# Patient Record
Sex: FEMALE | Race: OTHER | HISPANIC OR LATINO | URBAN - METROPOLITAN AREA
[De-identification: names, ages, dates, MRNs, and addresses within clinical notes are randomized per-mention and may not be internally consistent; named-entity substitution may affect disease eponyms.]

---

## 2017-07-17 ENCOUNTER — EMERGENCY (EMERGENCY)
Facility: HOSPITAL | Age: 52
LOS: 1 days | Discharge: HOME | End: 2017-07-17
Attending: EMERGENCY MEDICINE
Payer: MEDICAID

## 2017-07-17 VITALS
DIASTOLIC BLOOD PRESSURE: 90 MMHG | HEART RATE: 76 BPM | WEIGHT: 283.96 LBS | SYSTOLIC BLOOD PRESSURE: 154 MMHG | TEMPERATURE: 98 F | OXYGEN SATURATION: 98 % | RESPIRATION RATE: 20 BRPM

## 2017-07-17 VITALS
SYSTOLIC BLOOD PRESSURE: 151 MMHG | RESPIRATION RATE: 20 BRPM | HEART RATE: 69 BPM | TEMPERATURE: 97 F | OXYGEN SATURATION: 96 % | DIASTOLIC BLOOD PRESSURE: 98 MMHG

## 2017-07-17 PROCEDURE — T1013: CPT

## 2017-07-17 PROCEDURE — 99283 EMERGENCY DEPT VISIT LOW MDM: CPT

## 2017-07-17 NOTE — ED STATDOCS - ENMT, MLM
Neck is supple with FROM. Pink moist mucous membranes. uvula and tongue are midline. L TM: erythematous and dull. R TM: erythematous and dull.

## 2017-07-17 NOTE — ED STATDOCS - CARE PLAN
Principal Discharge DX:	Bilateral otitis media, unspecified chronicity, unspecified otitis media type

## 2017-07-17 NOTE — ED STATDOCS - OBJECTIVE STATEMENT
53 y/o F w/ PMHX of HLD and HTN presents to ED c/o b/l ear pain x2 weeks. Associated cough and fever that has resolved. Pt reports both ears began at the same time. Pt states she went to her PMD who Rx abx. Pt was not referred to ENT. Denies nausea, vomiting, decreased PO intake, or urinary sx. Pt states she has a f/u appointment with her PMD in 2 days. No further complaints at this time.  She is allergic to Ibuprofen. PSHx= thyroid surgery

## 2017-07-27 ENCOUNTER — APPOINTMENT (OUTPATIENT)
Dept: OTOLARYNGOLOGY | Facility: CLINIC | Age: 52
End: 2017-07-27
Payer: MEDICAID

## 2017-07-27 VITALS
WEIGHT: 284 LBS | SYSTOLIC BLOOD PRESSURE: 114 MMHG | HEART RATE: 74 BPM | DIASTOLIC BLOOD PRESSURE: 73 MMHG | HEIGHT: 65 IN | BODY MASS INDEX: 47.32 KG/M2

## 2017-07-27 DIAGNOSIS — H93.8X3 OTHER SPECIFIED DISORDERS OF EAR, BILATERAL: ICD-10-CM

## 2017-07-27 DIAGNOSIS — H93.13 TINNITUS, BILATERAL: ICD-10-CM

## 2017-07-27 DIAGNOSIS — H90.3 SENSORINEURAL HEARING LOSS, BILATERAL: ICD-10-CM

## 2017-07-27 DIAGNOSIS — H61.21 IMPACTED CERUMEN, RIGHT EAR: ICD-10-CM

## 2017-07-27 PROCEDURE — 92567 TYMPANOMETRY: CPT

## 2017-07-27 PROCEDURE — 92557 COMPREHENSIVE HEARING TEST: CPT

## 2017-07-27 PROCEDURE — G0268 REMOVAL OF IMPACTED WAX MD: CPT

## 2017-07-27 PROCEDURE — 99204 OFFICE O/P NEW MOD 45 MIN: CPT | Mod: 25

## 2018-05-23 ENCOUNTER — APPOINTMENT (OUTPATIENT)
Dept: BARIATRICS | Facility: CLINIC | Age: 53
End: 2018-05-23

## 2020-09-15 ENCOUNTER — ASOB RESULT (OUTPATIENT)
Age: 55
End: 2020-09-15

## 2020-09-15 PROCEDURE — 76856 US EXAM PELVIC COMPLETE: CPT | Mod: 59

## 2020-09-15 PROCEDURE — 76830 TRANSVAGINAL US NON-OB: CPT | Mod: 59

## 2021-08-16 ENCOUNTER — OUTPATIENT (OUTPATIENT)
Dept: OUTPATIENT SERVICES | Facility: HOSPITAL | Age: 56
LOS: 1 days | End: 2021-08-16
Payer: MEDICAID

## 2021-08-16 VITALS
DIASTOLIC BLOOD PRESSURE: 80 MMHG | HEART RATE: 78 BPM | HEIGHT: 64 IN | TEMPERATURE: 98 F | WEIGHT: 293 LBS | RESPIRATION RATE: 18 BRPM | SYSTOLIC BLOOD PRESSURE: 142 MMHG | OXYGEN SATURATION: 98 %

## 2021-08-16 DIAGNOSIS — Z01.818 ENCOUNTER FOR OTHER PREPROCEDURAL EXAMINATION: ICD-10-CM

## 2021-08-16 DIAGNOSIS — K64.9 UNSPECIFIED HEMORRHOIDS: ICD-10-CM

## 2021-08-16 DIAGNOSIS — Z71.89 OTHER SPECIFIED COUNSELING: ICD-10-CM

## 2021-08-16 DIAGNOSIS — Z29.9 ENCOUNTER FOR PROPHYLACTIC MEASURES, UNSPECIFIED: ICD-10-CM

## 2021-08-16 DIAGNOSIS — E89.0 POSTPROCEDURAL HYPOTHYROIDISM: Chronic | ICD-10-CM

## 2021-08-16 LAB
A1C WITH ESTIMATED AVERAGE GLUCOSE RESULT: 5.8 % — HIGH (ref 4–5.6)
ALBUMIN SERPL ELPH-MCNC: 4.4 G/DL — SIGNIFICANT CHANGE UP (ref 3.3–5.2)
ALP SERPL-CCNC: 110 U/L — SIGNIFICANT CHANGE UP (ref 40–120)
ALT FLD-CCNC: 29 U/L — SIGNIFICANT CHANGE UP
ANION GAP SERPL CALC-SCNC: 12 MMOL/L — SIGNIFICANT CHANGE UP (ref 5–17)
AST SERPL-CCNC: 30 U/L — SIGNIFICANT CHANGE UP
BASOPHILS # BLD AUTO: 0.04 K/UL — SIGNIFICANT CHANGE UP (ref 0–0.2)
BASOPHILS NFR BLD AUTO: 0.5 % — SIGNIFICANT CHANGE UP (ref 0–2)
BILIRUB SERPL-MCNC: 0.8 MG/DL — SIGNIFICANT CHANGE UP (ref 0.4–2)
BUN SERPL-MCNC: 13.2 MG/DL — SIGNIFICANT CHANGE UP (ref 8–20)
CALCIUM SERPL-MCNC: 9.9 MG/DL — SIGNIFICANT CHANGE UP (ref 8.6–10.2)
CHLORIDE SERPL-SCNC: 104 MMOL/L — SIGNIFICANT CHANGE UP (ref 98–107)
CO2 SERPL-SCNC: 25 MMOL/L — SIGNIFICANT CHANGE UP (ref 22–29)
CREAT SERPL-MCNC: 0.68 MG/DL — SIGNIFICANT CHANGE UP (ref 0.5–1.3)
EOSINOPHIL # BLD AUTO: 0.14 K/UL — SIGNIFICANT CHANGE UP (ref 0–0.5)
EOSINOPHIL NFR BLD AUTO: 1.9 % — SIGNIFICANT CHANGE UP (ref 0–6)
ESTIMATED AVERAGE GLUCOSE: 120 MG/DL — HIGH (ref 68–114)
GLUCOSE SERPL-MCNC: 103 MG/DL — HIGH (ref 70–99)
HCT VFR BLD CALC: 42 % — SIGNIFICANT CHANGE UP (ref 34.5–45)
HGB BLD-MCNC: 13.4 G/DL — SIGNIFICANT CHANGE UP (ref 11.5–15.5)
IMM GRANULOCYTES NFR BLD AUTO: 0.1 % — SIGNIFICANT CHANGE UP (ref 0–1.5)
LYMPHOCYTES # BLD AUTO: 2.28 K/UL — SIGNIFICANT CHANGE UP (ref 1–3.3)
LYMPHOCYTES # BLD AUTO: 31.2 % — SIGNIFICANT CHANGE UP (ref 13–44)
MCHC RBC-ENTMCNC: 29.8 PG — SIGNIFICANT CHANGE UP (ref 27–34)
MCHC RBC-ENTMCNC: 31.9 GM/DL — LOW (ref 32–36)
MCV RBC AUTO: 93.5 FL — SIGNIFICANT CHANGE UP (ref 80–100)
MONOCYTES # BLD AUTO: 0.55 K/UL — SIGNIFICANT CHANGE UP (ref 0–0.9)
MONOCYTES NFR BLD AUTO: 7.5 % — SIGNIFICANT CHANGE UP (ref 2–14)
NEUTROPHILS # BLD AUTO: 4.28 K/UL — SIGNIFICANT CHANGE UP (ref 1.8–7.4)
NEUTROPHILS NFR BLD AUTO: 58.8 % — SIGNIFICANT CHANGE UP (ref 43–77)
PLATELET # BLD AUTO: 253 K/UL — SIGNIFICANT CHANGE UP (ref 150–400)
POTASSIUM SERPL-MCNC: 3.7 MMOL/L — SIGNIFICANT CHANGE UP (ref 3.5–5.3)
POTASSIUM SERPL-SCNC: 3.7 MMOL/L — SIGNIFICANT CHANGE UP (ref 3.5–5.3)
PROT SERPL-MCNC: 7.5 G/DL — SIGNIFICANT CHANGE UP (ref 6.6–8.7)
RBC # BLD: 4.49 M/UL — SIGNIFICANT CHANGE UP (ref 3.8–5.2)
RBC # FLD: 13.4 % — SIGNIFICANT CHANGE UP (ref 10.3–14.5)
SODIUM SERPL-SCNC: 141 MMOL/L — SIGNIFICANT CHANGE UP (ref 135–145)
T3 SERPL-MCNC: 89 NG/DL — SIGNIFICANT CHANGE UP (ref 80–200)
T4 AB SER-ACNC: 7.3 UG/DL — SIGNIFICANT CHANGE UP (ref 4.5–12)
TSH SERPL-MCNC: 1.2 UIU/ML — SIGNIFICANT CHANGE UP (ref 0.27–4.2)
WBC # BLD: 7.3 K/UL — SIGNIFICANT CHANGE UP (ref 3.8–10.5)
WBC # FLD AUTO: 7.3 K/UL — SIGNIFICANT CHANGE UP (ref 3.8–10.5)

## 2021-08-16 PROCEDURE — 83036 HEMOGLOBIN GLYCOSYLATED A1C: CPT

## 2021-08-16 PROCEDURE — 80053 COMPREHEN METABOLIC PANEL: CPT

## 2021-08-16 PROCEDURE — 93010 ELECTROCARDIOGRAM REPORT: CPT

## 2021-08-16 PROCEDURE — 93005 ELECTROCARDIOGRAM TRACING: CPT

## 2021-08-16 PROCEDURE — 85025 COMPLETE CBC W/AUTO DIFF WBC: CPT

## 2021-08-16 PROCEDURE — 84480 ASSAY TRIIODOTHYRONINE (T3): CPT

## 2021-08-16 PROCEDURE — 84436 ASSAY OF TOTAL THYROXINE: CPT

## 2021-08-16 PROCEDURE — 84443 ASSAY THYROID STIM HORMONE: CPT

## 2021-08-16 PROCEDURE — 36415 COLL VENOUS BLD VENIPUNCTURE: CPT

## 2021-08-16 PROCEDURE — G0463: CPT

## 2021-08-16 RX ORDER — ATORVASTATIN CALCIUM 80 MG/1
1 TABLET, FILM COATED ORAL
Qty: 0 | Refills: 0 | DISCHARGE

## 2021-08-16 RX ORDER — LEVOTHYROXINE SODIUM 125 MCG
0 TABLET ORAL
Qty: 0 | Refills: 0 | DISCHARGE

## 2021-08-16 RX ORDER — LISINOPRIL 2.5 MG/1
1 TABLET ORAL
Qty: 0 | Refills: 0 | DISCHARGE

## 2021-08-16 NOTE — H&P PST ADULT - NSICDXFAMILYHX_GEN_ALL_CORE_FT
FAMILY HISTORY:  FH: HTN (hypertension)    Mother  Still living? No  Family history of diabetes mellitus (DM), Age at diagnosis: Age Unknown

## 2021-08-16 NOTE — H&P PST ADULT - HISTORY OF PRESENT ILLNESS
CHARO MUKHERJEE is a 56/y/o Faroese speaking female aox3 .PMH of HTN , HLD thyroidectomy Sigmoid diverticulosis, internal hemorrhoids, Patient c/o rectal bleed she went to DR Meehan for evaluation she was found to have hemorrhoids . Patient last colonoscopy was 10 years ago as per patient she had normal exam .Patient denies change in bowel habits denies c/o pain or discomfort . Patient presents to [facility] evaluation for a screening colonoscopy with Dr Meehan on 8/25/21  Sigmoid diverticulosis, internal hemorrhoids   CHARO MUKHERJEE is a 56/y/o Belizean speaking female aox3 .PMH of HTN , HLD thyroidectomy Sigmoid diverticulosis, internal hemorrhoids, Patient c/o rectal bleeding she went to her PCP DR Yañez for evaluation.  Patient  was referred to Dr Meehan . Patient went to DR Meehan for evaluation, she was found to have hemorrhoids as per patient she given a cream which relieved her symptoms .Patient last colonoscopy was 10 years ago as per patient she had normal exam .Patient denies change in bowel habits denies c/o pain or discomfort . Patient presents to [facility] evaluation for a screening colonoscopy with Dr Meehan on 8/25/21     CHARO MUKHERJEE is a 56/y/o Ethiopian speaking female aox3 .PMH of HTN , HLD thyroidectomy Sigmoid diverticulosis, internal hemorrhoids, Patient c/o rectal bleeding she went to her PCP DR Yañez for evaluation.  Patient  was referred to Dr Meehan . Patient went to DR Meehan for evaluation, she was found to have hemorrhoids as per patient she given a cream which relieved her symptoms .Patient last colonoscopy was 10 years ago as per patient she had normal exam .Patient denies change in bowel habits denies c/o pain or discomfort . Patient presents to PST evaluation for a screening colonoscopy with Dr Meehan on 8/25/21

## 2021-08-16 NOTE — H&P PST ADULT - NSANTHOSAYNRD_GEN_A_CORE
No. FAUZIA screening performed.  STOP BANG Legend: 0-2 = LOW Risk; 3-4 = INTERMEDIATE Risk; 5-8 = HIGH Risk

## 2021-08-16 NOTE — H&P PST ADULT - EKG AND INTERPRETATION
EKG reviewed personally Rate =       Bpm   finding    official reading EKG reviewed personally Rate =  70     Bpm   finding   normal sinus rhythm pending  official reading

## 2021-08-16 NOTE — H&P PST ADULT - NSICDXPASTMEDICALHX_GEN_ALL_CORE_FT
PAST MEDICAL HISTORY:  Diverticulosis     Hemorrhoid     HLD (hyperlipidemia)     HTN (hypertension)     No pertinent past medical history     Obese

## 2021-08-16 NOTE — H&P PST ADULT - PROBLEM SELECTOR PLAN 3
Patient educated on testing of covid as well as prevention of covid patient is going for test on 8/22/21

## 2021-08-16 NOTE — H&P PST ADULT - PROBLEM SELECTOR PLAN 2
caprini score is 3 at VTE risk SCD's ordered surgical team to assess the need for pharmacological, mechanical prophylaxis

## 2021-08-16 NOTE — H&P PST ADULT - NEGATIVE FEMALE-SPECIFIC SYMPTOMS
no irregular menses/no vaginal discharge/no dysmenorrhea/no amenorrhea/no menorrhagia/no spotting/no abnormal vaginal bleeding/no pelvic pain/not applicable

## 2021-08-16 NOTE — H&P PST ADULT - ASSESSMENT
OPIOID RISK TOOL    ROMA EACH BOX THAT APPLIES AND ADD TOTALS AT THE END    FAMILY HISTORY OF SUBSTANCE ABUSE                 FEMALE         MALE                                                Alcohol                             [  ]1 pt          [  ]3pts                                               Illegal Drugs                     [  ]2 pts        [  ]3pts                                               Rx Drugs                           [  ]4 pts        [  ]4 pts    PERSONAL HISTORY OF SUBSTANCE ABUSE                                                                                          Alcohol                             [  ]3 pts       [  ]3 pts                                               Illegal Drugs                     [  ]4 pts        [  ]4 pts                                               Rx Drugs                           [  ]5 pts        [  ]5 pts    AGE BETWEEN 16-45 YEARS                                      [  ]1 pt         [  ]1 pt    HISTORY OF PREADOLESCENT   SEXUAL ABUSE                                                             [  ]3 pts        [  ]0pts    PSYCHOLOGICAL DISEASE                     ADD, OCD, Bipolar, Schizophrenia        [  ]2 pts         [  ]2 pts                      Depression                                               [  ]1 pt           [  ]1 pt           SCORING TOTAL   (add numbers and type here)              (***)                                     A score of 3 or lower indicated LOW risk for future opioid abuse  A score of 4 to 7 indicated moderate risk for future opioid abuse  A score of 8 or higher indicates a high risk for opioid abuse      CAPRINI VTE 2.0 SCORE [CLOT updated 2019]    AGE RELATED RISK FACTORS                                                       MOBILITY RELATED FACTORS  [ ] Age 41-60 years                                            (1 Point)                    [ ] Bed rest                                                        (1 Point)  [ ] Age: 61-74 years                                           (2 Points)                  [ ] Plaster cast                                                   (2 Points)  [ ] Age= 75 years                                              (3 Points)                    [ ] Bed bound for more than 72 hours                 (2 Points)    DISEASE RELATED RISK FACTORS                                               GENDER SPECIFIC FACTORS  [ ] Edema in the lower extremities                       (1 Point)              [ ] Pregnancy                                                     (1 Point)  [ ] Varicose veins                                               (1 Point)                     [ ] Post-partum < 6 weeks                                   (1 Point)             [ ] BMI > 25 Kg/m2                                            (1 Point)                     [ ] Hormonal therapy  or oral contraception          (1 Point)                 [ ] Sepsis (in the previous month)                        (1 Point)               [ ] History of pregnancy complications                 (1 point)  [ ] Pneumonia or serious lung disease                                               [ ] Unexplained or recurrent                     (1 Point)           (in the previous month)                               (1 Point)  [ ] Abnormal pulmonary function test                     (1 Point)                 SURGERY RELATED RISK FACTORS  [ ] Acute myocardial infarction                              (1 Point)               [ ]  Section                                             (1 Point)  [ ] Congestive heart failure (in the previous month)  (1 Point)      [ ] Minor surgery                                                  (1 Point)   [ ] Inflammatory bowel disease                             (1 Point)               [ ] Arthroscopic surgery                                        (2 Points)  [ ] Central venous access                                      (2 Points)                [ ] General surgery lasting more than 45 minutes (2 points)  [ ] Malignancy- Present or previous                   (2 Points)                [ ] Elective arthroplasty                                         (5 points)    [ ] Stroke (in the previous month)                          (5 Points)                                                                                                                                                           HEMATOLOGY RELATED FACTORS                                                 TRAUMA RELATED RISK FACTORS  [ ] Prior episodes of VTE                                     (3 Points)                [ ] Fracture of the hip, pelvis, or leg                       (5 Points)  [ ] Positive family history for VTE                         (3 Points)             [ ] Acute spinal cord injury (in the previous month)  (5 Points)  [ ] Prothrombin 73617 A                                     (3 Points)               [ ] Paralysis  (less than 1 month)                             (5 Points)  [ ] Factor V Leiden                                             (3 Points)                  [ ] Multiple Trauma within 1 month                        (5 Points)  [ ] Lupus anticoagulants                                     (3 Points)                                                           [ ] Anticardiolipin antibodies                               (3 Points)                                                       [ ] High homocysteine in the blood                      (3 Points)                                             [ ] Other congenital or acquired thrombophilia      (3 Points)                                                [ ] Heparin induced thrombocytopenia                  (3 Points)                                     Total Score [          ] OPIOID RISK TOOL    ROMA EACH BOX THAT APPLIES AND ADD TOTALS AT THE END    FAMILY HISTORY OF SUBSTANCE ABUSE                 FEMALE         MALE                                                Alcohol                             [  ]1 pt          [  ]3pts                                               Illegal Drugs                     [  ]2 pts        [  ]3pts                                               Rx Drugs                           [  ]4 pts        [  ]4 pts    PERSONAL HISTORY OF SUBSTANCE ABUSE                                                                                          Alcohol                             [  ]3 pts       [  ]3 pts                                               Illegal Drugs                     [  ]4 pts        [  ]4 pts                                               Rx Drugs                           [  ]5 pts        [  ]5 pts    AGE BETWEEN 16-45 YEARS                                      [  ]1 pt         [  ]1 pt    HISTORY OF PREADOLESCENT   SEXUAL ABUSE                                                             [  ]3 pts        [  ]0pts    PSYCHOLOGICAL DISEASE                     ADD, OCD, Bipolar, Schizophrenia        [  ]2 pts         [  ]2 pts                      Depression                                               [  ]1 pt           [  ]1 pt           SCORING TOTAL   (add numbers and type here)              (**0*)                                     A score of 3 or lower indicated LOW risk for future opioid abuse  A score of 4 to 7 indicated moderate risk for future opioid abuse  A score of 8 or higher indicates a high risk for opioid abuse      CAPRINI VTE 2.0 SCORE [CLOT updated 2019]    AGE RELATED RISK FACTORS                                                       MOBILITY RELATED FACTORS  [x ] Age 41-60 years                                            (1 Point)                    [ ] Bed rest                                                        (1 Point)  [ ] Age: 61-74 years                                           (2 Points)                  [ ] Plaster cast                                                   (2 Points)  [ ] Age= 75 years                                              (3 Points)                    [ ] Bed bound for more than 72 hours                 (2 Points)    DISEASE RELATED RISK FACTORS                                               GENDER SPECIFIC FACTORS  [ ] Edema in the lower extremities                       (1 Point)              [ ] Pregnancy                                                     (1 Point)  [ ] Varicose veins                                               (1 Point)                     [ ] Post-partum < 6 weeks                                   (1 Point)             [ x] BMI > 25 Kg/m2                                            (1 Point)                     [ ] Hormonal therapy  or oral contraception          (1 Point)                 [ ] Sepsis (in the previous month)                        (1 Point)               [ ] History of pregnancy complications                 (1 point)  [ ] Pneumonia or serious lung disease                                               [ ] Unexplained or recurrent                     (1 Point)           (in the previous month)                               (1 Point)  [ ] Abnormal pulmonary function test                     (1 Point)                 SURGERY RELATED RISK FACTORS  [ ] Acute myocardial infarction                              (1 Point)               [ ]  Section                                             (1 Point)  [ ] Congestive heart failure (in the previous month)  (1 Point)      [x ] Minor surgery                                                  (1 Point)   [ ] Inflammatory bowel disease                             (1 Point)               [ ] Arthroscopic surgery                                        (2 Points)  [ ] Central venous access                                      (2 Points)                [ ] General surgery lasting more than 45 minutes (2 points)  [ ] Malignancy- Present or previous                   (2 Points)                [ ] Elective arthroplasty                                         (5 points)    [ ] Stroke (in the previous month)                          (5 Points)                                                                                                                                                           HEMATOLOGY RELATED FACTORS                                                 TRAUMA RELATED RISK FACTORS  [ ] Prior episodes of VTE                                     (3 Points)                [ ] Fracture of the hip, pelvis, or leg                       (5 Points)  [ ] Positive family history for VTE                         (3 Points)             [ ] Acute spinal cord injury (in the previous month)  (5 Points)  [ ] Prothrombin 52528 A                                     (3 Points)               [ ] Paralysis  (less than 1 month)                             (5 Points)  [ ] Factor V Leiden                                             (3 Points)                  [ ] Multiple Trauma within 1 month                        (5 Points)  [ ] Lupus anticoagulants                                     (3 Points)                                                           [ ] Anticardiolipin antibodies                               (3 Points)                                                       [ ] High homocysteine in the blood                      (3 Points)                                             [ ] Other congenital or acquired thrombophilia      (3 Points)                                                [ ] Heparin induced thrombocytopenia                  (3 Points)                                     Total Score [       3   ]      CHARO MUKHERJEE is a 56/y/o Nauruan speaking female aox3 .PMH of HTN , HLD thyroidectomy Sigmoid diverticulosis, internal hemorrhoids, Patient c/o rectal bleeding she went to her PCP DR Yañez for evaluation.  Patient  was referred to Dr Meehan . Patient went to DR Meehan for evaluation, she was found to have hemorrhoids as per patient she given a cream which relieved her symptoms .Patient last colonoscopy was 10 years ago as per patient she had normal exam .Patient denies change in bowel habits denies c/o pain or discomfort . Patient presents to [facility] evaluation for a screening colonoscopy with Dr Meehan on 21    Patient was educated on preoperative preparation with written and verbal instruction . Patient is going for medical clearance with DR Yañez [phone]  . Patient will review medications with PCP. Patient was educated on aspirin and aspirin products NSAIDs ,vitamins and herbals that increase the risk of bleeding and need to be stopped five days before procedure  . Patient was also educated on covid testing and covid prevention ,social distancing and wearing a mask.

## 2021-08-21 DIAGNOSIS — Z01.818 ENCOUNTER FOR OTHER PREPROCEDURAL EXAMINATION: ICD-10-CM

## 2021-08-22 ENCOUNTER — APPOINTMENT (OUTPATIENT)
Dept: DISASTER EMERGENCY | Facility: CLINIC | Age: 56
End: 2021-08-22

## 2021-08-23 LAB — SARS-COV-2 N GENE NPH QL NAA+PROBE: NOT DETECTED

## 2021-08-25 ENCOUNTER — OUTPATIENT (OUTPATIENT)
Dept: OUTPATIENT SERVICES | Facility: HOSPITAL | Age: 56
LOS: 1 days | End: 2021-08-25
Payer: MEDICAID

## 2021-08-25 ENCOUNTER — RESULT REVIEW (OUTPATIENT)
Age: 56
End: 2021-08-25

## 2021-08-25 DIAGNOSIS — E89.0 POSTPROCEDURAL HYPOTHYROIDISM: Chronic | ICD-10-CM

## 2021-08-25 DIAGNOSIS — K64.9 UNSPECIFIED HEMORRHOIDS: ICD-10-CM

## 2021-08-25 PROCEDURE — 88305 TISSUE EXAM BY PATHOLOGIST: CPT

## 2021-08-25 PROCEDURE — 88305 TISSUE EXAM BY PATHOLOGIST: CPT | Mod: 26

## 2021-08-25 PROCEDURE — 45380 COLONOSCOPY AND BIOPSY: CPT | Mod: PT

## 2021-08-30 LAB — SURGICAL PATHOLOGY STUDY: SIGNIFICANT CHANGE UP

## 2021-11-11 ENCOUNTER — NON-APPOINTMENT (OUTPATIENT)
Age: 56
End: 2021-11-11

## 2021-11-11 ENCOUNTER — APPOINTMENT (OUTPATIENT)
Dept: INTERNAL MEDICINE | Facility: CLINIC | Age: 56
End: 2021-11-11
Payer: MEDICAID

## 2021-11-11 VITALS
WEIGHT: 293 LBS | SYSTOLIC BLOOD PRESSURE: 116 MMHG | DIASTOLIC BLOOD PRESSURE: 82 MMHG | BODY MASS INDEX: 48.99 KG/M2 | HEART RATE: 76 BPM

## 2021-11-11 DIAGNOSIS — Z00.00 ENCOUNTER FOR GENERAL ADULT MEDICAL EXAMINATION W/OUT ABNORMAL FINDINGS: ICD-10-CM

## 2021-11-11 DIAGNOSIS — Z82.49 FAMILY HISTORY OF ISCHEMIC HEART DISEASE AND OTHER DISEASES OF THE CIRCULATORY SYSTEM: ICD-10-CM

## 2021-11-11 PROBLEM — K64.9 UNSPECIFIED HEMORRHOIDS: Chronic | Status: ACTIVE | Noted: 2021-08-16

## 2021-11-11 PROBLEM — K57.90 DIVERTICULOSIS OF INTESTINE, PART UNSPECIFIED, WITHOUT PERFORATION OR ABSCESS WITHOUT BLEEDING: Chronic | Status: ACTIVE | Noted: 2021-08-16

## 2021-11-11 PROBLEM — E66.9 OBESITY, UNSPECIFIED: Chronic | Status: ACTIVE | Noted: 2021-08-16

## 2021-11-11 PROBLEM — E78.5 HYPERLIPIDEMIA, UNSPECIFIED: Chronic | Status: ACTIVE | Noted: 2021-08-16

## 2021-11-11 PROBLEM — I10 ESSENTIAL (PRIMARY) HYPERTENSION: Chronic | Status: ACTIVE | Noted: 2021-08-16

## 2021-11-11 PROCEDURE — 99386 PREV VISIT NEW AGE 40-64: CPT | Mod: 25

## 2021-11-11 PROCEDURE — 93000 ELECTROCARDIOGRAM COMPLETE: CPT

## 2021-11-11 RX ORDER — MICONAZOLE NITRATE 20 MG/G
2 CREAM TOPICAL
Qty: 30 | Refills: 0 | Status: DISCONTINUED | COMMUNITY
Start: 2017-05-23 | End: 2021-11-11

## 2021-11-11 RX ORDER — ERGOCALCIFEROL 1.25 MG/1
1.25 MG CAPSULE, LIQUID FILLED ORAL
Qty: 4 | Refills: 0 | Status: DISCONTINUED | COMMUNITY
Start: 2017-05-23 | End: 2021-11-11

## 2021-11-11 RX ORDER — METFORMIN HYDROCHLORIDE 500 MG/1
500 TABLET, COATED ORAL DAILY
Qty: 90 | Refills: 3 | Status: ACTIVE | COMMUNITY
Start: 2021-11-11 | End: 1900-01-01

## 2021-11-11 RX ORDER — AMOXICILLIN 875 MG/1
TABLET, FILM COATED ORAL
Refills: 0 | Status: DISCONTINUED | COMMUNITY
End: 2021-11-11

## 2021-11-11 NOTE — HEALTH RISK ASSESSMENT
[Good] : ~his/her~  mood as  good [] : No [No] : No [No falls in past year] : Patient reported no falls in the past year [0] : 2) Feeling down, depressed, or hopeless: Not at all (0) [Patient reported colonoscopy was normal] : Patient reported colonoscopy was normal [HIV test declined] : HIV test declined [Change in mental status noted] : No change in mental status noted [Language] : denies difficulty with language [Behavior] : denies difficulty with behavior [Learning/Retaining New Information] : denies difficulty learning/retaining new information [Handling Complex Tasks] : denies difficulty handling complex tasks [Reasoning] : denies difficulty with reasoning [Spatial Ability and Orientation] : denies difficulty with spatial ability and orientation [None] : None [Fully functional (bathing, dressing, toileting, transferring, walking, feeding)] : Fully functional (bathing, dressing, toileting, transferring, walking, feeding) [Fully functional (using the telephone, shopping, preparing meals, housekeeping, doing laundry, using] : Fully functional and needs no help or supervision to perform IADLs (using the telephone, shopping, preparing meals, housekeeping, doing laundry, using transportation, managing medications and managing finances) [Reports changes in hearing] : Reports no changes in hearing [Reports changes in vision] : Reports no changes in vision [Reports normal functional visual acuity (ie: able to read med bottle)] : Reports poor functional visual acuity.  [Reports changes in dental health] : Reports no changes in dental health [Smoke Detector] : smoke detector [Carbon Monoxide Detector] : carbon monoxide detector [Guns at Home] : no guns at home [Safety elements used in home] : safety elements used in home [Seat Belt] :  uses seat belt [Sunscreen] : does not use sunscreen [Travel to Developing Areas] : does not  travel to developing areas [TB Exposure] : is not being exposed to tuberculosis [Caregiver Concerns] : does not have caregiver concerns [MammogramDate] : 2021 [ColonoscopyDate] : 2021 [AdvancecareDate] : 11/11/21

## 2021-11-11 NOTE — ASSESSMENT
[FreeTextEntry1] : medically stable\par check labs\par ekg ok\par follow up 3months\par up to date with vaccines\par needs 3rd booster dose covid

## 2021-11-11 NOTE — HISTORY OF PRESENT ILLNESS
[FreeTextEntry1] : For CPE [de-identified] : For CPE\par history of htn dm htd hypothyroidism\par recentl lost sister to illness\par she has no new issues today heathlwise\par is morbidly obese but not interested insurgery for weight loss

## 2021-11-12 LAB
APPEARANCE: CLEAR
BILIRUBIN URINE: NEGATIVE
BLOOD URINE: NEGATIVE
COLOR: YELLOW
CREAT SPEC-SCNC: 179 MG/DL
GLUCOSE QUALITATIVE U: NEGATIVE
KETONES URINE: NEGATIVE
LEUKOCYTE ESTERASE URINE: NEGATIVE
MICROALBUMIN 24H UR DL<=1MG/L-MCNC: <1.2 MG/DL
MICROALBUMIN/CREAT 24H UR-RTO: NORMAL MG/G
NITRITE URINE: NEGATIVE
PH URINE: 5.5
PROTEIN URINE: NORMAL
SPECIFIC GRAVITY URINE: 1.03
UROBILINOGEN URINE: NORMAL

## 2021-11-17 ENCOUNTER — APPOINTMENT (OUTPATIENT)
Dept: INTERNAL MEDICINE | Facility: CLINIC | Age: 56
End: 2021-11-17
Payer: MEDICAID

## 2021-11-17 DIAGNOSIS — Z23 ENCOUNTER FOR IMMUNIZATION: ICD-10-CM

## 2021-11-17 PROCEDURE — 0003A: CPT

## 2021-11-24 RX ORDER — ATORVASTATIN CALCIUM 20 MG/1
20 TABLET, FILM COATED ORAL
Qty: 90 | Refills: 3 | Status: ACTIVE | COMMUNITY
Start: 2016-07-19 | End: 1900-01-01

## 2022-01-10 RX ORDER — LEVOTHYROXINE SODIUM 0.03 MG/1
25 TABLET ORAL DAILY
Qty: 90 | Refills: 2 | Status: ACTIVE | COMMUNITY
Start: 2021-11-11 | End: 1900-01-01

## 2022-03-03 ENCOUNTER — APPOINTMENT (OUTPATIENT)
Dept: INTERNAL MEDICINE | Facility: CLINIC | Age: 57
End: 2022-03-03
Payer: MEDICAID

## 2022-03-03 VITALS
WEIGHT: 293 LBS | HEART RATE: 72 BPM | DIASTOLIC BLOOD PRESSURE: 80 MMHG | SYSTOLIC BLOOD PRESSURE: 128 MMHG | BODY MASS INDEX: 50.42 KG/M2

## 2022-03-03 DIAGNOSIS — R03.0 ELEVATED BLOOD-PRESSURE READING, W/OUT DIAGNOSIS OF HYPERTENSION: ICD-10-CM

## 2022-03-03 DIAGNOSIS — K21.9 GASTRO-ESOPHAGEAL REFLUX DISEASE W/OUT ESOPHAGITIS: ICD-10-CM

## 2022-03-03 DIAGNOSIS — E03.9 HYPOTHYROIDISM, UNSPECIFIED: ICD-10-CM

## 2022-03-03 DIAGNOSIS — E78.5 HYPERLIPIDEMIA, UNSPECIFIED: ICD-10-CM

## 2022-03-03 DIAGNOSIS — E11.9 TYPE 2 DIABETES MELLITUS W/OUT COMPLICATIONS: ICD-10-CM

## 2022-03-03 PROCEDURE — 99214 OFFICE O/P EST MOD 30 MIN: CPT | Mod: 25

## 2022-03-03 RX ORDER — LISINOPRIL 10 MG/1
10 TABLET ORAL DAILY
Qty: 90 | Refills: 2 | Status: ACTIVE | COMMUNITY
Start: 2017-05-23 | End: 1900-01-01

## 2022-03-03 RX ORDER — OMEPRAZOLE 40 MG/1
40 CAPSULE, DELAYED RELEASE ORAL
Qty: 30 | Refills: 2 | Status: ACTIVE | COMMUNITY
Start: 2022-03-03 | End: 1900-01-01

## 2022-03-03 RX ORDER — AZITHROMYCIN 250 MG/1
250 TABLET, FILM COATED ORAL
Qty: 6 | Refills: 0 | Status: DISCONTINUED | COMMUNITY
Start: 2017-07-10 | End: 2022-03-03

## 2022-03-03 NOTE — ASSESSMENT
[FreeTextEntry1] : dm better\par htn stqable\par hld to be checked\par thyroid to be checked feels fatigued but could be related to weight

## 2022-03-03 NOTE — HISTORY OF PRESENT ILLNESS
[FreeTextEntry1] : follow up dm, htn hld [de-identified] : follow up dm,htn hld\par has been well\par hs no chest pain sob nvd or palptiations\par morbid obeisty buit not ready for weigh loss surgery

## 2022-03-04 ENCOUNTER — NON-APPOINTMENT (OUTPATIENT)
Age: 57
End: 2022-03-04

## 2022-03-04 LAB
ALBUMIN SERPL ELPH-MCNC: 4.4 G/DL
ALP BLD-CCNC: 113 U/L
ALT SERPL-CCNC: 85 U/L
ANION GAP SERPL CALC-SCNC: 12 MMOL/L
APPEARANCE: CLEAR
AST SERPL-CCNC: 62 U/L
BASOPHILS # BLD AUTO: 0.05 K/UL
BASOPHILS NFR BLD AUTO: 0.9 %
BILIRUB SERPL-MCNC: 0.7 MG/DL
BILIRUBIN URINE: NEGATIVE
BLOOD URINE: NEGATIVE
BUN SERPL-MCNC: 8 MG/DL
CALCIUM SERPL-MCNC: 9.9 MG/DL
CHLORIDE SERPL-SCNC: 102 MMOL/L
CHOLEST SERPL-MCNC: 164 MG/DL
CO2 SERPL-SCNC: 27 MMOL/L
COLOR: YELLOW
CREAT SERPL-MCNC: 0.77 MG/DL
CREAT SPEC-SCNC: 193 MG/DL
EGFR: 89 ML/MIN/1.73M2
EOSINOPHIL # BLD AUTO: 0.38 K/UL
EOSINOPHIL NFR BLD AUTO: 6.7 %
ESTIMATED AVERAGE GLUCOSE: 126 MG/DL
GLUCOSE QUALITATIVE U: NEGATIVE
GLUCOSE SERPL-MCNC: 146 MG/DL
HBA1C MFR BLD HPLC: 6 %
HCT VFR BLD CALC: 44.7 %
HDLC SERPL-MCNC: 30 MG/DL
HGB BLD-MCNC: 14.1 G/DL
IMM GRANULOCYTES NFR BLD AUTO: 0.4 %
KETONES URINE: NEGATIVE
LDLC SERPL CALC-MCNC: NORMAL MG/DL
LEUKOCYTE ESTERASE URINE: NEGATIVE
LYMPHOCYTES # BLD AUTO: 2.29 K/UL
LYMPHOCYTES NFR BLD AUTO: 40.2 %
MAN DIFF?: NORMAL
MCHC RBC-ENTMCNC: 29.4 PG
MCHC RBC-ENTMCNC: 31.5 GM/DL
MCV RBC AUTO: 93.1 FL
MICROALBUMIN 24H UR DL<=1MG/L-MCNC: <1.2 MG/DL
MICROALBUMIN/CREAT 24H UR-RTO: NORMAL MG/G
MONOCYTES # BLD AUTO: 0.4 K/UL
MONOCYTES NFR BLD AUTO: 7 %
NEUTROPHILS # BLD AUTO: 2.56 K/UL
NEUTROPHILS NFR BLD AUTO: 44.8 %
NITRITE URINE: NEGATIVE
NONHDLC SERPL-MCNC: 134 MG/DL
PH URINE: 6
PLATELET # BLD AUTO: 225 K/UL
POTASSIUM SERPL-SCNC: 4 MMOL/L
PROT SERPL-MCNC: 7 G/DL
PROTEIN URINE: NORMAL
RBC # BLD: 4.8 M/UL
RBC # FLD: 13.9 %
SODIUM SERPL-SCNC: 141 MMOL/L
SPECIFIC GRAVITY URINE: 1.02
T4 FREE SERPL-MCNC: 1.1 NG/DL
TRIGL SERPL-MCNC: 447 MG/DL
TSH SERPL-ACNC: 1.8 UIU/ML
UROBILINOGEN URINE: NORMAL
WBC # FLD AUTO: 5.7 K/UL

## 2022-03-09 ENCOUNTER — RESULT CHARGE (OUTPATIENT)
Age: 57
End: 2022-03-09

## 2022-04-22 ENCOUNTER — APPOINTMENT (OUTPATIENT)
Dept: GASTROENTEROLOGY | Facility: CLINIC | Age: 57
End: 2022-04-22

## 2022-07-05 ENCOUNTER — APPOINTMENT (OUTPATIENT)
Dept: INTERNAL MEDICINE | Facility: CLINIC | Age: 57
End: 2022-07-05

## 2022-09-19 ENCOUNTER — RESULT REVIEW (OUTPATIENT)
Age: 57
End: 2022-09-19

## 2022-09-19 ENCOUNTER — OUTPATIENT (OUTPATIENT)
Dept: OUTPATIENT SERVICES | Facility: HOSPITAL | Age: 57
LOS: 1 days | End: 2022-09-19
Payer: MEDICAID

## 2022-09-19 DIAGNOSIS — Z86.010 PERSONAL HISTORY OF COLONIC POLYPS: ICD-10-CM

## 2022-09-19 DIAGNOSIS — E89.0 POSTPROCEDURAL HYPOTHYROIDISM: Chronic | ICD-10-CM

## 2022-09-19 LAB — GLUCOSE BLDC GLUCOMTR-MCNC: 115 MG/DL — HIGH (ref 70–99)

## 2022-09-19 PROCEDURE — 88305 TISSUE EXAM BY PATHOLOGIST: CPT | Mod: 26

## 2022-09-19 PROCEDURE — 82962 GLUCOSE BLOOD TEST: CPT

## 2022-09-19 PROCEDURE — 88305 TISSUE EXAM BY PATHOLOGIST: CPT

## 2022-09-19 PROCEDURE — 45380 COLONOSCOPY AND BIOPSY: CPT | Mod: PT

## 2022-09-22 LAB — SURGICAL PATHOLOGY STUDY: SIGNIFICANT CHANGE UP

## 2023-01-27 ENCOUNTER — APPOINTMENT (OUTPATIENT)
Dept: ORTHOPEDIC SURGERY | Facility: CLINIC | Age: 58
End: 2023-01-27
Payer: COMMERCIAL

## 2023-01-27 VITALS — WEIGHT: 282 LBS | HEIGHT: 60 IN | BODY MASS INDEX: 55.36 KG/M2

## 2023-01-27 PROCEDURE — 99203 OFFICE O/P NEW LOW 30 MIN: CPT | Mod: 25

## 2023-01-27 PROCEDURE — 73562 X-RAY EXAM OF KNEE 3: CPT | Mod: 26,LT

## 2023-01-27 PROCEDURE — 20610 DRAIN/INJ JOINT/BURSA W/O US: CPT | Mod: LT

## 2023-01-27 RX ORDER — MELOXICAM 7.5 MG/1
7.5 TABLET ORAL
Qty: 30 | Refills: 1 | Status: ACTIVE | COMMUNITY
Start: 2023-01-27 | End: 1900-01-01

## 2023-01-27 NOTE — PROCEDURE
[de-identified] : Left kneeProcedure: Injection of the knee. \par Indication:  Osteoarthritis. \par Risk and benefits were discussed with the patient. Potential complications include bleeding and infection. Verbal consent was obtained prior to the procedure. \par Chloraprep was used to prep the area. ethyl chloride spray was used as a topical anesthetic. Using sterile technique, the aspiration/injection needle was then directed from a medial aspect was used to inject 5 mL of 1% Lidocaine and 2 mL of 40mg/mL methylprednisolone. A bandage was applied. The patient tolerated the procedure well. \par Complications: none. \par Patient instructed to avoid strenuous activity for 1 day(s). \par Follow-up in the office as needed.

## 2023-01-27 NOTE — HISTORY OF PRESENT ILLNESS
[de-identified] : Patient presents today for initial evaluation of left knee pain.  She reports having knee pain for about 4 months now.  She reports over the past 2 weeks the pain suddenly increased.  It is primarily medial.  She reports today the pain has markedly improved since emergency department visit.  She was recommended anti-inflammatories and a cane.  She has been seen in the past possibly for this knee pain about 15 years ago and possibly had a corticosteroid injection.  No past surgeries of this knee.  No recent direct trauma.  No other related complaints today.\par \par Lebanese video  was used\par \par Review of Systems-\par Constitutional: No fever or chills. \par Cardiovascular: No orthopnea or chest pain\par Pulmonary: No shortness of breath. \par GI: No nausea or vomiting or abdominal pain.\par Musculoskeletal: see HPI \par Psychiatric: No anxiety and depression.

## 2023-01-27 NOTE — PHYSICAL EXAM
[de-identified] : The patient is conversive and in no apparent distress. The patient is alert and oriented to person, place, and time. Affect and mood appear normal. The head is normocephalic and atraumatic. Skin shows normal turgor with no evidence of eczema or psoriasis. No respiratory distress noted. Sensation grossly intact. MUSCULOSKELETAL:   SEE BELOW\par \par Left knee exam demonstrates skin that is clean, dry intact.  No surgical scars.  No signs of acute trauma.  Normal temperature.  Small effusion.  -2 degree varus alignment.  Extension is 0 degrees.  Flexion is limited to 105 degrees secondary to body habitus.  Mild laxity with medial/lateral stress testing.  No extensor mechanism lag.  Mild varicose veins and venous stasis noted distally.  2+ DP pulse.  Normal ankle motion.  Good strength against resistance of knee and ankle.  No foot drop.\par  [de-identified] : Three-view left knee x-ray demonstrates marked medial joint space narrowing.  Small osteophyte formation is appreciated of the medial and patellofemoral joints.  No retained hardware.

## 2023-01-27 NOTE — DISCUSSION/SUMMARY
[de-identified] : 30 minutes was spent reviewing the x-rays as well as discussing with the patient their clinical presentation, diagnosis and providing education.  Using a Egyptian video , the patient was informed that she has advanced osteoarthritis of the left knee.  She is clinically symptomatic.  She would like to proceed with a corticosteroid injection today.  That was performed.  Additionally she was recommended meloxicam.  A prescription was sent.  Instruction education were provided.  She will continue with the use of the cane.  She will be seen back in 3 months time.  She will monitor for improvement.  She does understand that this may continue to progress and impact her activity.  All questions were answered to the patient's satisfaction.

## 2023-04-21 ENCOUNTER — APPOINTMENT (OUTPATIENT)
Dept: ORTHOPEDIC SURGERY | Facility: CLINIC | Age: 58
End: 2023-04-21
Payer: MEDICAID

## 2023-04-21 DIAGNOSIS — M17.12 UNILATERAL PRIMARY OSTEOARTHRITIS, LEFT KNEE: ICD-10-CM

## 2023-04-21 PROCEDURE — 99213 OFFICE O/P EST LOW 20 MIN: CPT

## 2023-04-21 NOTE — HISTORY OF PRESENT ILLNESS
[Worsening] : worsening [6] : an average pain level of 6/10 [Sitting] : sitting [Standing] : standing [Intermit.] : ~He/She~ states the symptoms seem to be intermittent [Bending] : worsened by bending [Direct Pressure] : worsened by direct pressure [Walking] : worsened by walking [Knee Flexion] : worsened with knee flexion [Knee Extension] : worsened with knee extension [Acetaminophen] : relieved by acetaminophen [NSAIDs] : relieved by nonsteroidal anti-inflammatory drugs [Rest] : relieved by rest [de-identified] : 58-year-old female presents for follow-up of the left knee.  Last visit she had a cortisone injection which she states helped her pain however it is now returning.  She states that the pain is worse when she is up and walking around and ambulating.  She is taking Tylenol as well as meloxicam with improvement in symptoms.  She presents today for thing to temporize her symptoms.  She denies any new injuries.  Symptoms are remaining stable from previous examinations.\par Denies any fevers chills chest pain calf pain shortness of breath

## 2023-04-21 NOTE — PHYSICAL EXAM
[LE] : Sensory: Intact in bilateral lower extremities [DP] : dorsalis pedis 2+ and symmetric bilaterally [Normal LLE] : Left Lower Extremity: No scars, rashes, lesions, ulcers, skin intact [Normal Touch] : sensation intact for touch [Obese] : obese [Normal] : no peripheral adenopathy appreciated [de-identified] : On physical exam of the left knee skin is warm and dry without erythema ecchymosis signs or symptoms of infection superficial or deep.  There is no tenderness to palpation throughout the knee joint.  There is no palpable effusion.  Range of motion is 0-130 without pain or stiffness.  The knee is stable with varus and valgus stress.  There is a negative anterior posterior drawer.  Sensation is intact throughout the distal lower extremity.  Strength is well-maintained in all planes.  There is negative Homans exam.  2+ dorsalis pedis pulse.  [de-identified] : No xrays at todays visit

## 2023-04-21 NOTE — REASON FOR VISIT
[[employer] ] : provided by [employer]   [Interpreters_IDNumber] : [id number] [Interpreters_FullName] : Jackeline [TWNoteComboBox1] : [place]

## 2023-04-21 NOTE — DISCUSSION/SUMMARY
[de-identified] : Treatment options were reviewed in detail with the patient.  Discussed injections including cortisone or viscosupplementation.  This time patient would like to try viscosupplementation injection as she is afraid of the cortisone injection will cause her to gain weight.  Advised patient that the cortisone injection does not cause weight gain however she would still like to progress to a viscosupplementation injection.  Reviewed risk benefits and alternatives to injections.  Request authorization for viscosupplementation from the insurance company.  Patient will follow-up once injections are approved.  All patient's questions and concerns were addressed to satisfaction.  If any new or worsening symptoms arise advised patient to call the office.

## 2023-04-24 RX ORDER — HYALURONATE SODIUM, STABILIZED 88 MG/4 ML
88 SYRINGE (ML) INTRAARTICULAR
Qty: 1 | Refills: 0 | Status: ACTIVE | OUTPATIENT
Start: 2023-04-21

## 2023-08-18 ENCOUNTER — OFFICE (OUTPATIENT)
Dept: URBAN - METROPOLITAN AREA CLINIC 94 | Facility: CLINIC | Age: 58
Setting detail: OPHTHALMOLOGY
End: 2023-08-18
Payer: MEDICARE

## 2023-08-18 DIAGNOSIS — H40.033: ICD-10-CM

## 2023-08-18 DIAGNOSIS — H25.13: ICD-10-CM

## 2023-08-18 DIAGNOSIS — H35.033: ICD-10-CM

## 2023-08-18 PROCEDURE — 92020 GONIOSCOPY: CPT | Performed by: OPHTHALMOLOGY

## 2023-08-18 PROCEDURE — 92250 FUNDUS PHOTOGRAPHY W/I&R: CPT | Performed by: OPHTHALMOLOGY

## 2023-08-18 PROCEDURE — 92004 COMPRE OPH EXAM NEW PT 1/>: CPT | Performed by: OPHTHALMOLOGY

## 2023-08-18 ASSESSMENT — REFRACTION_MANIFEST
OD_CYLINDER: SPH
OS_SPHERE: PLANO
OS_ADD: +1.75
OD_VA1: 20/20
OS_VA1: 20/20
OD_SPHERE: -0.25
OD_ADD: +1.75

## 2023-08-18 ASSESSMENT — REFRACTION_AUTOREFRACTION
OD_CYLINDER: -0.50
OD_AXIS: 088
OS_AXIS: 012
OD_SPHERE: +0.25
OS_SPHERE: +0.25
OS_CYLINDER: -0.25

## 2023-08-18 ASSESSMENT — VISUAL ACUITY
OD_BCVA: 20/20
OS_BCVA: 20/25

## 2023-08-18 ASSESSMENT — KERATOMETRY
OS_K1POWER_DIOPTERS: 46.25
OS_AXISANGLE_DEGREES: 093
OD_K2POWER_DIOPTERS: 47.00
OD_K1POWER_DIOPTERS: 46.25
OS_K2POWER_DIOPTERS: 47.50
OD_AXISANGLE_DEGREES: 086

## 2023-08-18 ASSESSMENT — TONOMETRY
OS_IOP_MMHG: 15
OD_IOP_MMHG: 14

## 2023-08-18 ASSESSMENT — AXIALLENGTH_DERIVED
OD_AL: 22.4973
OS_AL: 22.3703

## 2023-08-18 ASSESSMENT — SPHEQUIV_DERIVED
OD_SPHEQUIV: 0
OS_SPHEQUIV: 0.125

## 2023-08-18 ASSESSMENT — CONFRONTATIONAL VISUAL FIELD TEST (CVF)
OD_FINDINGS: FULL
OS_FINDINGS: FULL

## 2023-09-01 ENCOUNTER — ASC (OUTPATIENT)
Dept: URBAN - METROPOLITAN AREA SURGERY 8 | Facility: SURGERY | Age: 58
Setting detail: OPHTHALMOLOGY
End: 2023-09-01
Payer: MEDICARE

## 2023-09-01 DIAGNOSIS — H40.031: ICD-10-CM

## 2023-09-01 PROCEDURE — 66761 REVISION OF IRIS: CPT | Performed by: OPHTHALMOLOGY

## 2023-09-01 ASSESSMENT — SPHEQUIV_DERIVED
OD_SPHEQUIV: 0
OS_SPHEQUIV: 0.125

## 2023-09-01 ASSESSMENT — KERATOMETRY
OS_K1POWER_DIOPTERS: 46.25
OD_K1POWER_DIOPTERS: 46.25
OS_AXISANGLE_DEGREES: 093
OD_K2POWER_DIOPTERS: 47.00
OD_AXISANGLE_DEGREES: 086
OS_K2POWER_DIOPTERS: 47.50

## 2023-09-01 ASSESSMENT — VISUAL ACUITY
OD_BCVA: 20/20
OS_BCVA: 20/25

## 2023-09-01 ASSESSMENT — REFRACTION_MANIFEST
OD_ADD: +1.75
OS_VA1: 20/20
OD_CYLINDER: SPH
OS_SPHERE: PLANO
OD_VA1: 20/20
OS_ADD: +1.75
OD_SPHERE: -0.25

## 2023-09-01 ASSESSMENT — REFRACTION_AUTOREFRACTION
OS_CYLINDER: -0.25
OS_SPHERE: +0.25
OD_CYLINDER: -0.50
OS_AXIS: 012
OD_SPHERE: +0.25
OD_AXIS: 088

## 2023-09-01 ASSESSMENT — AXIALLENGTH_DERIVED
OD_AL: 22.4973
OS_AL: 22.3703

## 2023-09-08 ENCOUNTER — ASC (OUTPATIENT)
Dept: URBAN - METROPOLITAN AREA SURGERY 8 | Facility: SURGERY | Age: 58
Setting detail: OPHTHALMOLOGY
End: 2023-09-08
Payer: MEDICARE

## 2023-09-08 DIAGNOSIS — H40.032: ICD-10-CM

## 2023-09-08 PROCEDURE — 66761 REVISION OF IRIS: CPT | Performed by: OPHTHALMOLOGY

## 2023-09-08 ASSESSMENT — REFRACTION_AUTOREFRACTION
OS_CYLINDER: -0.25
OS_SPHERE: +0.25
OS_AXIS: 012
OD_CYLINDER: -0.50
OD_SPHERE: +0.25
OD_AXIS: 088

## 2023-09-08 ASSESSMENT — KERATOMETRY
OD_K1POWER_DIOPTERS: 46.25
OS_K2POWER_DIOPTERS: 47.50
OD_K2POWER_DIOPTERS: 47.00
OD_AXISANGLE_DEGREES: 086
OS_AXISANGLE_DEGREES: 093
OS_K1POWER_DIOPTERS: 46.25

## 2023-09-08 ASSESSMENT — REFRACTION_MANIFEST
OD_VA1: 20/20
OS_SPHERE: PLANO
OS_ADD: +1.75
OS_VA1: 20/20
OD_ADD: +1.75
OD_CYLINDER: SPH
OD_SPHERE: -0.25

## 2023-09-08 ASSESSMENT — AXIALLENGTH_DERIVED
OD_AL: 22.4973
OS_AL: 22.3703

## 2023-09-08 ASSESSMENT — SPHEQUIV_DERIVED
OS_SPHEQUIV: 0.125
OD_SPHEQUIV: 0

## 2023-09-08 ASSESSMENT — VISUAL ACUITY
OD_BCVA: 20/20
OS_BCVA: 20/25

## 2023-09-27 ENCOUNTER — OFFICE (OUTPATIENT)
Dept: URBAN - METROPOLITAN AREA CLINIC 94 | Facility: CLINIC | Age: 58
Setting detail: OPHTHALMOLOGY
End: 2023-09-27
Payer: MEDICARE

## 2023-09-27 DIAGNOSIS — H25.13: ICD-10-CM

## 2023-09-27 DIAGNOSIS — H35.363: ICD-10-CM

## 2023-09-27 DIAGNOSIS — H40.033: ICD-10-CM

## 2023-09-27 DIAGNOSIS — H35.033: ICD-10-CM

## 2023-09-27 PROBLEM — H40.032 NARROW ANGLES; LEFT EYE: Status: RESOLVED | Noted: 2023-09-08 | Resolved: 2023-09-27

## 2023-09-27 PROCEDURE — 99213 OFFICE O/P EST LOW 20 MIN: CPT | Performed by: OPHTHALMOLOGY

## 2023-09-27 ASSESSMENT — REFRACTION_MANIFEST
OD_SPHERE: -0.25
OS_VA1: 20/20
OS_SPHERE: PLANO
OD_VA1: 20/20
OD_ADD: +1.75
OD_CYLINDER: SPH
OS_ADD: +1.75

## 2023-09-27 ASSESSMENT — KERATOMETRY
OS_AXISANGLE_DEGREES: 082
OS_K2POWER_DIOPTERS: 47.50
OD_K2POWER_DIOPTERS: 47.25
OD_AXISANGLE_DEGREES: 081
OD_K1POWER_DIOPTERS: 46.50
OS_K1POWER_DIOPTERS: 46.25

## 2023-09-27 ASSESSMENT — CONFRONTATIONAL VISUAL FIELD TEST (CVF)
OS_FINDINGS: FULL
OD_FINDINGS: FULL

## 2023-09-27 ASSESSMENT — AXIALLENGTH_DERIVED
OS_AL: 22.3703
OD_AL: 22.4141

## 2023-09-27 ASSESSMENT — SPHEQUIV_DERIVED
OS_SPHEQUIV: 0.125
OD_SPHEQUIV: 0

## 2023-09-27 ASSESSMENT — TONOMETRY
OS_IOP_MMHG: 16
OD_IOP_MMHG: 16

## 2023-09-27 ASSESSMENT — REFRACTION_AUTOREFRACTION
OS_CYLINDER: -0.25
OS_AXIS: 108
OD_AXIS: 090
OD_SPHERE: +0.25
OD_CYLINDER: -0.50
OS_SPHERE: +0.25

## 2023-09-27 ASSESSMENT — VISUAL ACUITY
OS_BCVA: 20/25
OD_BCVA: 20/20

## 2024-04-05 ENCOUNTER — APPOINTMENT (OUTPATIENT)
Dept: NEUROSURGERY | Facility: CLINIC | Age: 59
End: 2024-04-05
Payer: MEDICAID

## 2024-04-05 VITALS
TEMPERATURE: 98.8 F | DIASTOLIC BLOOD PRESSURE: 100 MMHG | WEIGHT: 293 LBS | SYSTOLIC BLOOD PRESSURE: 170 MMHG | HEART RATE: 84 BPM | BODY MASS INDEX: 57.52 KG/M2 | HEIGHT: 60 IN | OXYGEN SATURATION: 96 %

## 2024-04-05 DIAGNOSIS — M54.17 RADICULOPATHY, LUMBOSACRAL REGION: ICD-10-CM

## 2024-04-05 PROCEDURE — 99203 OFFICE O/P NEW LOW 30 MIN: CPT

## 2024-04-08 PROBLEM — M54.17 RIGHT LUMBOSACRAL RADICULOPATHY: Status: ACTIVE | Noted: 2024-04-08

## 2024-04-08 NOTE — HISTORY OF PRESENT ILLNESS
[FreeTextEntry1] : lower back and RLE pain  [de-identified] : CHARO MUKHERJEE is a 59 year old female presents for initial neurosurgical evaluation of right lumbar radiculopathy.  Past medical history includes HLD, hypothyroidism. obesity and GERD Patient mentions his lower back pain starts in a band like distribution with radiation to bilateral lower extremities. R > L.    LBP > LE pain.  Pain intensity 7/10.   The pain is described as intermittent in nature with numbness/tingling and burning.   Patient has diminished sensation to the lower extremities as well.

## 2024-04-08 NOTE — REVIEW OF SYSTEMS
[As Noted in HPI] : as noted in HPI [Negative] : Heme/Lymph [Numbness] : numbness [Tingling] : tingling [Difficulty Walking] : difficulty walking

## 2024-04-08 NOTE — REASON FOR VISIT
[New Patient Visit] : a new patient visit [Referred By: _________] : Patient was referred by [facility]RA [FreeTextEntry1] : right lumbar radiculopathy

## 2024-04-08 NOTE — PHYSICAL EXAM
[General Appearance - Alert] : alert [General Appearance - In No Acute Distress] : in no acute distress [Oriented To Time, Place, And Person] : oriented to person, place, and time [Impaired Insight] : insight and judgment were intact [Affect] : the affect was normal [Person] : oriented to person [Place] : oriented to place [Time] : oriented to time [Short Term Intact] : short term memory intact [Remote Intact] : remote memory intact [Span Intact] : the attention span was normal [Concentration Intact] : normal concentrating ability [Fluency] : fluency intact [Comprehension] : comprehension intact [Current Events] : adequate knowledge of current events [Past History] : adequate knowledge of personal past history [Vocabulary] : adequate range of vocabulary [Cranial Nerves Optic (II)] : visual acuity intact bilaterally,  pupils equal round and reactive to light [Cranial Nerves Oculomotor (III)] : extraocular motion intact [Cranial Nerves Trigeminal (V)] : facial sensation intact symmetrically [Cranial Nerves Facial (VII)] : face symmetrical [Cranial Nerves Vestibulocochlear (VIII)] : hearing was intact bilaterally [Cranial Nerves Glossopharyngeal (IX)] : tongue and palate midline [Cranial Nerves Accessory (XI - Cranial And Spinal)] : head turning and shoulder shrug symmetric [Cranial Nerves Hypoglossal (XII)] : there was no tongue deviation with protrusion [Motor Tone] : muscle tone was normal in all four extremities [Motor Strength] : muscle strength was normal in all four extremities [No Muscle Atrophy] : normal bulk in all four extremities [Sensation Tactile Decrease] : light touch was intact [Abnormal Walk] : normal gait [Balance] : balance was intact [Past-pointing] : there was no past-pointing [Tremor] : no tremor present [2+] : Patella left 2+ [Antalgic] : antalgic

## 2024-10-09 ENCOUNTER — OFFICE (OUTPATIENT)
Dept: URBAN - METROPOLITAN AREA CLINIC 94 | Facility: CLINIC | Age: 59
Setting detail: OPHTHALMOLOGY
End: 2024-10-09
Payer: MEDICARE

## 2024-10-09 DIAGNOSIS — H25.13: ICD-10-CM

## 2024-10-09 DIAGNOSIS — H40.033: ICD-10-CM

## 2024-10-09 DIAGNOSIS — H35.033: ICD-10-CM

## 2024-10-09 DIAGNOSIS — H35.363: ICD-10-CM

## 2024-10-09 PROCEDURE — 92250 FUNDUS PHOTOGRAPHY W/I&R: CPT | Performed by: OPHTHALMOLOGY

## 2024-10-09 PROCEDURE — 99213 OFFICE O/P EST LOW 20 MIN: CPT | Performed by: OPHTHALMOLOGY

## 2024-10-09 ASSESSMENT — REFRACTION_AUTOREFRACTION
OS_CYLINDER: -0.50
OD_CYLINDER: -0.75
OS_AXIS: 130
OS_SPHERE: +1.00
OD_SPHERE: +1.00
OD_AXIS: 095

## 2024-10-09 ASSESSMENT — KERATOMETRY
OS_K2POWER_DIOPTERS: 47.25
OS_AXISANGLE_DEGREES: 090
OS_K1POWER_DIOPTERS: 46.00
OD_AXISANGLE_DEGREES: 095
OD_K2POWER_DIOPTERS: 47.00
OD_K1POWER_DIOPTERS: 46.25

## 2024-10-09 ASSESSMENT — TONOMETRY
OD_IOP_MMHG: 17
OS_IOP_MMHG: 20

## 2024-10-09 ASSESSMENT — REFRACTION_MANIFEST
OS_VA1: 20/20
OS_ADD: +1.75
OD_SPHERE: -0.25
OD_VA1: 20/20
OS_SPHERE: PLANO
OD_CYLINDER: SPH
OD_ADD: +1.75

## 2024-10-09 ASSESSMENT — VISUAL ACUITY
OS_BCVA: 20/30-1
OD_BCVA: 20/25

## 2024-10-09 ASSESSMENT — CONFRONTATIONAL VISUAL FIELD TEST (CVF)
OD_FINDINGS: FULL
OS_FINDINGS: FULL

## 2024-11-13 ENCOUNTER — OFFICE (OUTPATIENT)
Dept: URBAN - METROPOLITAN AREA CLINIC 94 | Facility: CLINIC | Age: 59
Setting detail: OPHTHALMOLOGY
End: 2024-11-13
Payer: MEDICARE

## 2024-11-13 DIAGNOSIS — E11.9: ICD-10-CM

## 2024-11-13 DIAGNOSIS — H35.033: ICD-10-CM

## 2024-11-13 DIAGNOSIS — H25.13: ICD-10-CM

## 2024-11-13 DIAGNOSIS — H40.033: ICD-10-CM

## 2024-11-13 DIAGNOSIS — H35.363: ICD-10-CM

## 2024-11-13 PROCEDURE — 92083 EXTENDED VISUAL FIELD XM: CPT | Performed by: OPHTHALMOLOGY

## 2024-11-13 PROCEDURE — 92012 INTRM OPH EXAM EST PATIENT: CPT | Performed by: OPHTHALMOLOGY

## 2024-11-13 PROCEDURE — 92133 CPTRZD OPH DX IMG PST SGM ON: CPT | Performed by: OPHTHALMOLOGY

## 2024-11-13 ASSESSMENT — CONFRONTATIONAL VISUAL FIELD TEST (CVF)
OD_FINDINGS: FULL
OS_FINDINGS: FULL

## 2024-11-13 ASSESSMENT — VISUAL ACUITY
OD_BCVA: 20/25
OS_BCVA: 20/30-

## 2024-11-13 ASSESSMENT — REFRACTION_MANIFEST
OS_VA1: 20/20
OD_CYLINDER: SPH
OD_ADD: +1.75
OS_ADD: +1.75
OD_SPHERE: -0.25
OS_SPHERE: PLANO
OD_VA1: 20/20

## 2024-11-13 ASSESSMENT — REFRACTION_AUTOREFRACTION
OD_AXIS: 087
OD_CYLINDER: -1.00
OS_AXIS: 118
OS_SPHERE: +0.77
OD_SPHERE: +0.75
OS_CYLINDER: -0.25

## 2024-11-13 ASSESSMENT — KERATOMETRY
OS_AXISANGLE_DEGREES: 083
OS_K1POWER_DIOPTERS: 45.75
OS_K2POWER_DIOPTERS: 47.25

## 2024-11-13 ASSESSMENT — TONOMETRY
OD_IOP_MMHG: 16
OS_IOP_MMHG: 17

## 2024-12-11 ENCOUNTER — OFFICE (OUTPATIENT)
Dept: URBAN - METROPOLITAN AREA CLINIC 94 | Facility: CLINIC | Age: 59
Setting detail: OPHTHALMOLOGY
End: 2024-12-11
Payer: MEDICARE

## 2024-12-11 DIAGNOSIS — E11.3311: ICD-10-CM

## 2024-12-11 DIAGNOSIS — H35.033: ICD-10-CM

## 2024-12-11 DIAGNOSIS — H35.373: ICD-10-CM

## 2024-12-11 DIAGNOSIS — H25.13: ICD-10-CM

## 2024-12-11 DIAGNOSIS — H35.363: ICD-10-CM

## 2024-12-11 DIAGNOSIS — E11.3312: ICD-10-CM

## 2024-12-11 DIAGNOSIS — H40.033: ICD-10-CM

## 2024-12-11 PROCEDURE — 92235 FLUORESCEIN ANGRPH MLTIFRAME: CPT | Performed by: OPHTHALMOLOGY

## 2024-12-11 PROCEDURE — 99214 OFFICE O/P EST MOD 30 MIN: CPT | Performed by: OPHTHALMOLOGY

## 2024-12-11 PROCEDURE — 92134 CPTRZ OPH DX IMG PST SGM RTA: CPT | Performed by: OPHTHALMOLOGY

## 2024-12-11 ASSESSMENT — REFRACTION_AUTOREFRACTION
OD_AXIS: 90
OS_SPHERE: +1.00
OD_CYLINDER: -0.75
OD_SPHERE: +0.75
OS_CYLINDER: -0.25
OS_AXIS: 111

## 2024-12-11 ASSESSMENT — KERATOMETRY
OD_AXISANGLE_DEGREES: 085
OS_K1POWER_DIOPTERS: 46.00
OS_AXISANGLE_DEGREES: 089
OD_K2POWER_DIOPTERS: 47.00
OD_K1POWER_DIOPTERS: 46.25
OS_K2POWER_DIOPTERS: 47.00

## 2024-12-11 ASSESSMENT — VISUAL ACUITY
OD_BCVA: 20/25-1
OS_BCVA: 20/20-1

## 2024-12-11 ASSESSMENT — REFRACTION_MANIFEST
OS_VA1: 20/20
OD_ADD: +1.75
OD_VA1: 20/20
OS_SPHERE: PLANO
OS_ADD: +1.75
OD_CYLINDER: SPH
OD_SPHERE: -0.25

## 2024-12-11 ASSESSMENT — CONFRONTATIONAL VISUAL FIELD TEST (CVF)
OS_FINDINGS: FULL
OD_FINDINGS: FULL

## 2024-12-11 ASSESSMENT — TONOMETRY
OD_IOP_MMHG: 17
OS_IOP_MMHG: 17

## 2024-12-18 ENCOUNTER — ASC (OUTPATIENT)
Dept: URBAN - METROPOLITAN AREA SURGERY 8 | Facility: SURGERY | Age: 59
Setting detail: OPHTHALMOLOGY
End: 2024-12-18
Payer: MEDICARE

## 2024-12-18 DIAGNOSIS — E11.3311: ICD-10-CM

## 2024-12-18 PROCEDURE — 67210 TREATMENT OF RETINAL LESION: CPT | Mod: RT | Performed by: OPHTHALMOLOGY

## 2024-12-18 ASSESSMENT — REFRACTION_MANIFEST
OD_ADD: +1.75
OD_SPHERE: -0.25
OD_CYLINDER: SPH
OS_ADD: +1.75
OS_VA1: 20/20
OD_VA1: 20/20
OS_SPHERE: PLANO

## 2024-12-18 ASSESSMENT — VISUAL ACUITY
OD_BCVA: 20/25-1
OS_BCVA: 20/20-1

## 2024-12-18 ASSESSMENT — KERATOMETRY
OD_AXISANGLE_DEGREES: 085
OD_K2POWER_DIOPTERS: 47.00
OS_K1POWER_DIOPTERS: 46.00
OD_K1POWER_DIOPTERS: 46.25
OS_AXISANGLE_DEGREES: 089
OS_K2POWER_DIOPTERS: 47.00

## 2024-12-18 ASSESSMENT — REFRACTION_AUTOREFRACTION
OD_AXIS: 90
OS_AXIS: 111
OD_CYLINDER: -0.75
OD_SPHERE: +0.75
OS_CYLINDER: -0.25
OS_SPHERE: +1.00

## 2024-12-23 ENCOUNTER — ASC (OUTPATIENT)
Dept: URBAN - METROPOLITAN AREA SURGERY 8 | Facility: SURGERY | Age: 59
Setting detail: OPHTHALMOLOGY
End: 2024-12-23
Payer: MEDICARE

## 2024-12-23 DIAGNOSIS — E11.3312: ICD-10-CM

## 2024-12-23 PROBLEM — E11.3311 DM TYPE 2; RIGHT MOD WITH ME, LEFT MOD WITH ME: Status: ACTIVE | Noted: 2024-12-11

## 2024-12-23 PROBLEM — H35.373 EPIRETINAL MEMBRANE; BOTH EYES: Status: ACTIVE | Noted: 2024-12-11

## 2024-12-23 PROCEDURE — 67210 TREATMENT OF RETINAL LESION: CPT | Mod: 79,LT | Performed by: OPHTHALMOLOGY

## 2024-12-24 ASSESSMENT — REFRACTION_MANIFEST
OD_ADD: +1.75
OD_SPHERE: -0.25
OS_ADD: +1.75
OS_SPHERE: PLANO
OS_VA1: 20/20
OD_VA1: 20/20
OD_CYLINDER: SPH

## 2024-12-24 ASSESSMENT — REFRACTION_AUTOREFRACTION
OS_SPHERE: +1.00
OD_AXIS: 90
OS_AXIS: 111
OS_CYLINDER: -0.25
OD_SPHERE: +0.75
OD_CYLINDER: -0.75

## 2024-12-24 ASSESSMENT — KERATOMETRY
OD_AXISANGLE_DEGREES: 085
OS_AXISANGLE_DEGREES: 089
OD_K2POWER_DIOPTERS: 47.00
OS_K2POWER_DIOPTERS: 47.00
OS_K1POWER_DIOPTERS: 46.00
OD_K1POWER_DIOPTERS: 46.25

## 2024-12-24 ASSESSMENT — VISUAL ACUITY
OS_BCVA: 20/20-1
OD_BCVA: 20/25-1

## 2025-05-01 ENCOUNTER — NON-APPOINTMENT (OUTPATIENT)
Age: 60
End: 2025-05-01

## 2025-05-02 ENCOUNTER — APPOINTMENT (OUTPATIENT)
Dept: GASTROENTEROLOGY | Facility: CLINIC | Age: 60
End: 2025-05-02
Payer: MEDICAID

## 2025-05-02 VITALS
TEMPERATURE: 97.9 F | HEIGHT: 60 IN | OXYGEN SATURATION: 96 % | DIASTOLIC BLOOD PRESSURE: 97 MMHG | SYSTOLIC BLOOD PRESSURE: 159 MMHG | BODY MASS INDEX: 57.52 KG/M2 | HEART RATE: 78 BPM | WEIGHT: 293 LBS

## 2025-05-02 DIAGNOSIS — K21.9 GASTRO-ESOPHAGEAL REFLUX DISEASE W/OUT ESOPHAGITIS: ICD-10-CM

## 2025-05-02 DIAGNOSIS — Z12.11 ENCOUNTER FOR SCREENING FOR MALIGNANT NEOPLASM OF COLON: ICD-10-CM

## 2025-05-02 DIAGNOSIS — Z86.39 PERSONAL HISTORY OF OTHER ENDOCRINE, NUTRITIONAL AND METABOLIC DISEASE: ICD-10-CM

## 2025-05-02 PROCEDURE — 99204 OFFICE O/P NEW MOD 45 MIN: CPT

## 2025-05-02 PROCEDURE — G2211 COMPLEX E/M VISIT ADD ON: CPT | Mod: NC

## 2025-05-02 RX ORDER — POLYETHYLENE GLYCOL 3350, SODIUM SULFATE, POTASSIUM CHLORIDE, MAGNESIUM SULFATE, AND SODIUM CHLORIDE FOR ORAL SOLUTION 178.7-7.3G
178.7 KIT ORAL
Qty: 1 | Refills: 0 | Status: ACTIVE | COMMUNITY
Start: 2025-05-02 | End: 1900-01-01

## 2025-05-13 RX ORDER — POLYETHYLENE GLYCOL-3350 AND ELECTROLYTES WITH FLAVOR PACK 240; 5.84; 2.98; 6.72; 22.72 G/278.26G; G/278.26G; G/278.26G; G/278.26G; G/278.26G
240 POWDER, FOR SOLUTION ORAL
Qty: 1 | Refills: 0 | Status: ACTIVE | COMMUNITY
Start: 2025-05-13 | End: 1900-01-01

## 2025-08-07 ENCOUNTER — OFFICE (OUTPATIENT)
Dept: URBAN - METROPOLITAN AREA CLINIC 94 | Facility: CLINIC | Age: 60
Setting detail: OPHTHALMOLOGY
End: 2025-08-07
Payer: MEDICARE

## 2025-08-07 DIAGNOSIS — H35.373: ICD-10-CM

## 2025-08-07 DIAGNOSIS — E11.3313: ICD-10-CM

## 2025-08-07 DIAGNOSIS — H25.13: ICD-10-CM

## 2025-08-07 PROCEDURE — 92014 COMPRE OPH EXAM EST PT 1/>: CPT | Performed by: OPHTHALMOLOGY

## 2025-08-07 ASSESSMENT — REFRACTION_AUTOREFRACTION
OS_AXIS: 128
OD_SPHERE: +1.00
OS_SPHERE: +0.25
OS_CYLINDER: -1.00
OD_CYLINDER: -0.75
OD_AXIS: 088

## 2025-08-07 ASSESSMENT — KERATOMETRY
OD_K1POWER_DIOPTERS: 46.25
OS_K1POWER_DIOPTERS: 47.00
OS_K2POWER_DIOPTERS: 47.25
OD_K2POWER_DIOPTERS: 47.00
OD_AXISANGLE_DEGREES: 078
OS_AXISANGLE_DEGREES: 085

## 2025-08-07 ASSESSMENT — REFRACTION_MANIFEST
OD_SPHERE: -0.25
OD_ADD: +1.75
OS_ADD: +1.75
OD_VA1: 20/20
OS_SPHERE: PLANO
OD_CYLINDER: SPH
OS_VA1: 20/20

## 2025-08-07 ASSESSMENT — TONOMETRY
OS_IOP_MMHG: 19
OD_IOP_MMHG: 16

## 2025-08-07 ASSESSMENT — VISUAL ACUITY
OS_BCVA: 20/20
OD_BCVA: 20/20

## 2025-08-07 ASSESSMENT — CONFRONTATIONAL VISUAL FIELD TEST (CVF)
OS_FINDINGS: FULL
OD_FINDINGS: FULL

## 2025-09-18 ENCOUNTER — TRANSCRIPTION ENCOUNTER (OUTPATIENT)
Age: 60
End: 2025-09-18

## 2025-09-18 ENCOUNTER — APPOINTMENT (OUTPATIENT)
Dept: GASTROENTEROLOGY | Facility: HOSPITAL | Age: 60
End: 2025-09-18

## 2025-09-18 ENCOUNTER — RESULT REVIEW (OUTPATIENT)
Age: 60
End: 2025-09-18

## 2025-09-18 DIAGNOSIS — Z12.11 ENCOUNTER FOR SCREENING FOR MALIGNANT NEOPLASM OF COLON: ICD-10-CM

## (undated) DEVICE — VALVE ENDOSCOPE DEFENDO SINGLE USE

## (undated) DEVICE — FORCEP RADIAL JAW 4 W NDL 2.4MM 2.8MM 240CM ORANGE DISP

## (undated) DEVICE — SSH-ERBE RM1 11351341: Type: DURABLE MEDICAL EQUIPMENT

## (undated) DEVICE — ELCTR GROUNDING PAD ADULT COVIDIEN

## (undated) DEVICE — SNARE MINI-MICRO OVAL